# Patient Record
Sex: FEMALE | Race: OTHER | HISPANIC OR LATINO | Employment: UNEMPLOYED | URBAN - METROPOLITAN AREA
[De-identification: names, ages, dates, MRNs, and addresses within clinical notes are randomized per-mention and may not be internally consistent; named-entity substitution may affect disease eponyms.]

---

## 2018-01-15 NOTE — MISCELLANEOUS
Provider Comments  Provider Comments:   patient no showed their 9AM new patient appointment today 12/07/2016      Signatures   Electronically signed by : TONE Powers; Dec  7 2016  9:31AM EST                       (Author)

## 2018-06-07 ENCOUNTER — HOSPITAL ENCOUNTER (EMERGENCY)
Facility: HOSPITAL | Age: 28
Discharge: HOME | End: 2018-06-08
Attending: EMERGENCY MEDICINE

## 2018-06-07 VITALS
SYSTOLIC BLOOD PRESSURE: 110 MMHG | WEIGHT: 139 LBS | BODY MASS INDEX: 21.13 KG/M2 | RESPIRATION RATE: 16 BRPM | TEMPERATURE: 99.1 F | HEART RATE: 75 BPM | OXYGEN SATURATION: 98 % | DIASTOLIC BLOOD PRESSURE: 65 MMHG

## 2018-06-07 DIAGNOSIS — H10.9 CONJUNCTIVITIS: Primary | ICD-10-CM

## 2018-06-07 RX ORDER — PROPARACAINE HYDROCHLORIDE 5 MG/ML
2 SOLUTION/ DROPS OPHTHALMIC ONCE
Status: COMPLETED | OUTPATIENT
Start: 2018-06-07 | End: 2018-06-07

## 2018-06-07 RX ADMIN — PROPARACAINE HYDROCHLORIDE 2 DROP: 5 SOLUTION/ DROPS OPHTHALMIC at 23:48

## 2018-06-08 PROCEDURE — 99283 EMERGENCY DEPT VISIT LOW MDM: CPT

## 2018-06-08 RX ORDER — KETOTIFEN FUMARATE 0.35 MG/ML
1 SOLUTION/ DROPS OPHTHALMIC 2 TIMES DAILY PRN
Qty: 5 ML | Refills: 0 | Status: SHIPPED | OUTPATIENT
Start: 2018-06-08 | End: 2019-07-22

## 2018-06-08 RX ORDER — KETOROLAC TROMETHAMINE 5 MG/ML
1 SOLUTION OPHTHALMIC EVERY 6 HOURS
Qty: 0.4 ML | Refills: 0 | Status: SHIPPED | OUTPATIENT
Start: 2018-06-08 | End: 2019-07-22

## 2018-06-08 NOTE — DISCHARGE INSTRUCTIONS
Conjunctivitis   GENERAL INFORMATION:   What is conjunctivitis? Conjunctivitis, or pink eye, is inflammation of your conjunctiva  The conjunctiva is a thin tissue that covers the front of your eye and the back of your eyelids  The conjunctiva helps protect your eye and keep it moist         What causes conjunctivitis? Conjunctivitis is easily spread from person to person  The most common cause of conjunctivitis is infection with bacteria or a virus  This often happens when bacteria are introduced to your eye  For example, this can happen when you touch your eye or wear contact lenses  Allergies are also a common cause of conjunctivitis  The cells in your conjunctiva can react to an allergen  Some examples of allergens include grass, dust, animal fur, or mascara  What are the signs and symptoms of conjunctivitis? You will usually have symptoms in both eyes if your conjunctivitis is caused by allergies  You may also have other allergic symptoms, such as a rash or runny nose  Symptoms will usually start in 1 eye if your conjunctivitis is caused by a virus or bacteria  You may also have other symptoms of an infection, such as sore throat and fever  You may have any of the following:  · Redness in the whites of your eye    · Itching in your eye or around your eye    · Feeling like there is something in your eye    · Watery or thick, sticky discharge    · Crusty eyelids when you wake up in the morning    · Burning, stinging, or swelling in your eye    · Pain when you see bright light  How is conjunctivitis diagnosed? Your caregiver will ask about your symptoms and medical history  He will ask if you have been around anyone who is sick or has pink eye  He will ask if you have allergies  Tell him if you wear contact lenses  You may need any of the following:  · Eye exam:  Your caregiver will look at your eyes, eyelids, eyelashes, and the skin around your eyes  He will ask you to look in different directions   He may gently press on your eye or eyelid to see if there is discharge  He will also look for redness and swelling in your eyelids or conjunctiva  Your caregiver may gently swab your conjunctiva with a cotton swab and send it to the lab for tests  This will help your caregiver find out what is causing your conjunctivitis  · Slit-lamp microscope: Your caregiver will use a special microscope with a bright light to look into your eye  He will look for signs of infection or inflammation  This microscope also helps your caregiver see if the different parts of your eyes are healthy  How is conjunctivitis treated? Your conjunctivitis may go away on its own  Treatment depends on what is causing your conjunctivitis  You may need any of the following:  · Allergy medicine: This medicine helps decrease itchy, red, swollen eyes caused by allergies  It may be given as a pill, eye drops, or nasal spray  · Antibiotics:  You may need antibiotics if your conjunctivitis is caused by bacteria  This medicine may be given as a pill, eye drops, or eye ointment  · Steroid medicine: This medicine helps decrease inflammation  It may be given as a pill, eye drops, or nasal spray  How can I manage my symptoms? · Apply a cool compress:  Wet a washcloth with cold water and place it on your eye  This will help decrease swelling  · Use eye drops:  Eye drops, or artificial tears, can be bought without a doctor's order  They help keep your eye moist     · Do not wear contact lenses: They can irritate your eye  Throw away the pair you are using and ask when you can wear them again  Use a new pair of lenses when your caregiver says it is okay  · Flush your eye:  You may need to flush your eye with saline to help decrease your symptoms  Ask for more information on how to flush your eye  How do I prevent the spread of conjunctivitis? · Wash your hands often:  Wash your hands before you touch your eyes   Also wash your hands before you prepare or eat food and after you use the bathroom or change a diaper  · Avoid allergens:  Try to avoid the things that cause your allergies, such as pets, dust, or grass  · Avoid contact:  Do not share towels or washcloths  Try to stay away from others as much as possible  Ask when you can return to work or school  · Throw away eye makeup:  Throw away mascara and other eye makeup  What are the risks of conjunctivitis? You may have a burning, itching, or stinging feeling in your eye when you use eye drops or ointment  Your eye medicine may cause your symptoms, such as eye swelling, to get worse  Your eyes may become sensitive to light  Without treatment, you may get scars or sores in your eye  The swelling in your eye can cause your eyesight to get blurry  You may lose vision completely  The bacteria may spread to other parts of your eye, your sinuses, or the tissues in your brain  This can be life-threatening  Ask your caregiver if you have questions about the risks of conjunctivitis  When should I contact my caregiver? Contact your caregiver if:  · Your eyesight becomes blurry  · You have tiny bumps or spots of blood on your eye  · You have questions or concerns about your condition or care  When should I seek immediate care? Seek care immediately or call 911 if:  · The swelling in your eye gets worse, even after treatment  · Your vision suddenly becomes worse or you cannot see at all  · Your eye begins to bleed  CARE AGREEMENT:   You have the right to help plan your care  Learn about your health condition and how it may be treated  Discuss treatment options with your caregivers to decide what care you want to receive  You always have the right to refuse treatment  The above information is an  only  It is not intended as medical advice for individual conditions or treatments   Talk to your doctor, nurse or pharmacist before following any medical regimen to see if it is safe and effective for you  © 2014 [address] is for End User's use only and may not be sold, redistributed or otherwise used for commercial purposes  All illustrations and images included in CareNotes® are the copyrighted property of [employer]  or [employer]

## 2018-06-08 NOTE — ED NOTES
Patient states that she cannot see when RN is attempting to preform visual acuity  RN suggested that if patient opened her eyes, it would be more beneficial  Patient states that she still cannot see  RN then clarified if patient was having difficulty opening her eyes or if patient actually couldn't see visual acuity screen  Patient then opened her eyes and preformed visual acuity        Christiano Bean, HAYDEN  06/08/18 8058

## 2018-06-08 NOTE — ED PROVIDER NOTES
History  Chief Complaint   Patient presents with    Eye Pain     pt c/o bilateral eye pain with redness; pt states it started this morning at 04:00  pt denies any other symptoms  77-year-old female presents for evaluation of bilateral eye redness and pain that started this morning  Patient reports that this morning she was unable to open her eyes due to a burning sensation along with pain  States that she does not recall any inciting event that may have caused this  She reports that she has more pain in the right eye than the left  States that she has tried applying cool compresses minimal relief  Reports that she is photophobic along with having blurred vision  Denies increased lacrimation  She reports having nasal congestion  Does state that she has a history of seasonal allergies  She denies fever, chills, nausea, vomiting, facial pressure  None       Past Medical History:   Diagnosis Date    Patient denies medical problems     Patient is a currently breast-feeding mother        Past Surgical History:   Procedure Laterality Date    CHOLECYSTECTOMY      CHOLECYSTECTOMY LAPAROSCOPIC N/A 11/25/2016    Procedure: CHOLECYSTECTOMY LAPAROSCOPIC with intraoperative cholangiogram;  Surgeon: Blaze Rockwell MD;  Location: [facility];  Service:     ESOPHAGOGASTRODUODENOSCOPY N/A 11/23/2016    Procedure: ESOPHAGOGASTRODUODENOSCOPY (EGD); Surgeon: Bryanna Kitchen MD;  Location: [facility]; Service:        Family History   Problem Relation Age of Onset    Gallbladder disease Maternal Grandmother      I have reviewed and agree with the history as documented  Social History   Substance Use Topics    Smoking status: Never Smoker    Smokeless tobacco: Never Used    Alcohol use No        Review of Systems   Constitutional: Negative for chills and fever  HENT: Positive for congestion  Negative for rhinorrhea  Eyes: Positive for photophobia, pain, redness, itching and visual disturbance  Negative for discharge  Respiratory: Negative for cough  Cardiovascular: Negative for chest pain  Gastrointestinal: Negative for diarrhea, nausea and vomiting  Skin: Negative  Physical Exam  Physical Exam   Constitutional: She appears well-developed and well-nourished  No distress  In room with patient's eyes closed, hardly able to open them   HENT:   Head: Normocephalic  Eyes: EOM and lids are normal  Pupils are equal, round, and reactive to light  Lids are everted and swept, no foreign bodies found  Right eye exhibits no chemosis, no discharge, no exudate and no hordeolum  No foreign body present in the right eye  Left eye exhibits no chemosis, no discharge, no exudate and no hordeolum  No foreign body present in the left eye  Right conjunctiva is injected  Left conjunctiva is not injected  No scleral icterus  Slit lamp exam:       The right eye shows no corneal abrasion, no corneal flare, no corneal ulcer, no foreign body, no hyphema, no hypopyon and no fluorescein uptake  The left eye shows no corneal abrasion, no corneal flare, no corneal ulcer, no foreign body, no hyphema, no hypopyon and no fluorescein uptake  IOP:  Left eye av 75 Right eye avg: 15 40   Fluorescein exam: sparing of the limbus, no corneal abrasions or ulcerations noted  No FB noted  Skin: She is not diaphoretic  Vitals reviewed  Vital Signs  ED Triage Vitals [18]   Temperature Pulse Respirations Blood Pressure SpO2   99 1 °F (37 3 °C) 75 16 110/65 98 %      Temp Source Heart Rate Source Patient Position - Orthostatic VS BP Location FiO2 (%)   Oral Monitor Sitting Right arm --      Pain Score       9           Vitals:    188   BP: 110/65   Pulse: 75   Patient Position - Orthostatic VS: Sitting       Visual Acuity  Visual Acuity      Most Recent Value   Visual acuity R eye is  20/200   Visual acuity Left eye is  20/70   Wearing corrective eyewear/lenses?   No          ED Medications  Medications   proparacaine (ALCAINE) 0 5 % ophthalmic solution 2 drop (2 drops Both Eyes Given 6/7/18 5515)       Diagnostic Studies  Results Reviewed     None                 No orders to display              Procedures  Procedures       Phone Contacts  ED Phone Contact    ED Course                               MDM  CritCare Time    Disposition  Final diagnoses:   Conjunctivitis     Time reflects when diagnosis was documented in both MDM as applicable and the Disposition within this note     Time User Action Codes Description Comment    6/8/2018 12:29 AM Rick Grant Add [H10 9] Conjunctivitis       ED Disposition     ED Disposition Condition Comment    Discharge  Delia Naik discharge to home/self care      Condition at discharge: Good        Follow-up Information     Follow up With Specialties Details Why [facility] Schedule an appointment as soon as possible for a visit in 2 days [facility]            Discharge Medication List as of 6/8/2018 12:32 AM      START taking these medications    Details   ketorolac (ACULAR) 0 5 % ophthalmic solution Administer 1 drop to both eyes every 6 (six) hours for 2 days Space dosing 8 - 12 hours apart, Starting Fri 6/8/2018, Until Sun 6/10/2018, Print      ketotifen (ZADITOR) 0 025 % ophthalmic solution Administer 1 drop to both eyes 2 (two) times a day as needed (itchng and burning) for up to 5 days, Starting Fri 6/8/2018, Until Wed 6/13/2018, Print         CONTINUE these medications which have NOT CHANGED    Details   acetaminophen (TYLENOL) 325 mg tablet Take 2 tablets by mouth every 6 (six) hours as needed for mild pain or fever, Starting 11/26/2016, Until Discontinued, Print      docusate sodium (COLACE) 100 mg capsule Take 1 capsule by mouth 2 (two) times a day for 30 days, Starting 11/26/2016, Until Mon 12/26/16, Print      ondansetron (ZOFRAN-ODT) 4 mg disintegrating tablet Take 1 tablet by mouth every 8 (eight) hours as needed for nausea or vomiting for up to 10 days, Starting 11/26/2016, Until Tue 12/6/16, Print           No discharge procedures on file      ED Provider  Electronically Signed by           Jules Marquez PA-C  06/08/18 0145

## 2019-07-22 ENCOUNTER — HOSPITAL ENCOUNTER (EMERGENCY)
Facility: HOSPITAL | Age: 29
Discharge: HOME | End: 2019-07-22
Attending: EMERGENCY MEDICINE | Admitting: EMERGENCY MEDICINE

## 2019-07-22 VITALS
TEMPERATURE: 97.1 F | SYSTOLIC BLOOD PRESSURE: 114 MMHG | RESPIRATION RATE: 18 BRPM | DIASTOLIC BLOOD PRESSURE: 74 MMHG | WEIGHT: 157.63 LBS | HEART RATE: 56 BPM | OXYGEN SATURATION: 100 % | BODY MASS INDEX: 23.97 KG/M2

## 2019-07-22 DIAGNOSIS — N39.0 UTI (URINARY TRACT INFECTION): Primary | ICD-10-CM

## 2019-07-22 LAB
BACTERIA UR QL AUTO: ABNORMAL /HPF
BASOPHILS # BLD AUTO: 0.1 THOUSANDS/ΜL (ref 0–0.1)
BASOPHILS NFR BLD AUTO: 1 % (ref 0–1)
BILIRUB UR QL STRIP: NEGATIVE
CLARITY UR: ABNORMAL
COLOR UR: ABNORMAL
EOSINOPHIL # BLD AUTO: 0.1 THOUSAND/ΜL (ref 0–0.4)
EOSINOPHIL NFR BLD AUTO: 3 % (ref 0–6)
ERYTHROCYTE [DISTWIDTH] IN BLOOD BY AUTOMATED COUNT: 13.7 %
EXT PREG TEST URINE: NEGATIVE
EXT. CONTROL ED NAV: NORMAL
GLUCOSE UR STRIP-MCNC: NEGATIVE MG/DL
HCG SERPL QL: NEGATIVE
HCT VFR BLD AUTO: 34.7 % (ref 36–46)
HGB BLD-MCNC: 10.8 G/DL (ref 12–16)
HGB UR QL STRIP.AUTO: NEGATIVE
KETONES UR STRIP-MCNC: NEGATIVE MG/DL
LEUKOCYTE ESTERASE UR QL STRIP: 100
LYMPHOCYTES # BLD AUTO: 1.9 THOUSANDS/ΜL (ref 0.5–4)
LYMPHOCYTES NFR BLD AUTO: 39 % (ref 25–45)
MCH RBC QN AUTO: 24 PG (ref 26–34)
MCHC RBC AUTO-ENTMCNC: 31.3 G/DL (ref 31–36)
MCV RBC AUTO: 77 FL (ref 80–100)
MICROCYTES BLD QL AUTO: PRESENT
MONOCYTES # BLD AUTO: 0.4 THOUSAND/ΜL (ref 0.2–0.9)
MONOCYTES NFR BLD AUTO: 9 % (ref 1–10)
NEUTROPHILS # BLD AUTO: 2.4 THOUSANDS/ΜL (ref 1.8–7.8)
NEUTS SEG NFR BLD AUTO: 48 % (ref 45–65)
NITRITE UR QL STRIP: NEGATIVE
NON-SQ EPI CELLS URNS QL MICRO: ABNORMAL /HPF
PH UR STRIP.AUTO: 6 [PH]
PLATELET # BLD AUTO: 169 THOUSANDS/UL (ref 150–450)
PLATELET BLD QL SMEAR: ADEQUATE
PMV BLD AUTO: 10.7 FL (ref 8.9–12.7)
PROT UR STRIP-MCNC: NEGATIVE MG/DL
RBC # BLD AUTO: 4.53 MILLION/UL (ref 4–5.2)
RBC #/AREA URNS AUTO: ABNORMAL /HPF
RBC MORPH BLD: NORMAL
SP GR UR STRIP.AUTO: 1.01 (ref 1–1.04)
UROBILINOGEN UA: NEGATIVE MG/DL
WBC # BLD AUTO: 4.9 THOUSAND/UL (ref 4.5–11)
WBC #/AREA URNS AUTO: ABNORMAL /HPF

## 2019-07-22 PROCEDURE — 36415 COLL VENOUS BLD VENIPUNCTURE: CPT | Performed by: PHYSICIAN ASSISTANT

## 2019-07-22 PROCEDURE — 81001 URINALYSIS AUTO W/SCOPE: CPT | Performed by: PHYSICIAN ASSISTANT

## 2019-07-22 PROCEDURE — 81025 URINE PREGNANCY TEST: CPT | Performed by: PHYSICIAN ASSISTANT

## 2019-07-22 PROCEDURE — 96360 HYDRATION IV INFUSION INIT: CPT

## 2019-07-22 PROCEDURE — 85025 COMPLETE CBC W/AUTO DIFF WBC: CPT | Performed by: PHYSICIAN ASSISTANT

## 2019-07-22 PROCEDURE — 84703 CHORIONIC GONADOTROPIN ASSAY: CPT | Performed by: PHYSICIAN ASSISTANT

## 2019-07-22 PROCEDURE — 87086 URINE CULTURE/COLONY COUNT: CPT | Performed by: PHYSICIAN ASSISTANT

## 2019-07-22 PROCEDURE — 81003 URINALYSIS AUTO W/O SCOPE: CPT | Performed by: PHYSICIAN ASSISTANT

## 2019-07-22 PROCEDURE — 99284 EMERGENCY DEPT VISIT MOD MDM: CPT

## 2019-07-22 PROCEDURE — 99283 EMERGENCY DEPT VISIT LOW MDM: CPT | Performed by: PHYSICIAN ASSISTANT

## 2019-07-22 RX ORDER — SODIUM CHLORIDE 9 MG/ML
1000 INJECTION, SOLUTION INTRAVENOUS CONTINUOUS
Status: DISCONTINUED | OUTPATIENT
Start: 2019-07-22 | End: 2019-07-22 | Stop reason: HOSPADM

## 2019-07-22 RX ORDER — CEPHALEXIN 500 MG/1
500 CAPSULE ORAL EVERY 6 HOURS SCHEDULED
Qty: 14 CAPSULE | Refills: 0 | Status: SHIPPED | OUTPATIENT
Start: 2019-07-22 | End: 2019-07-29

## 2019-07-22 RX ADMIN — SODIUM CHLORIDE 1000 ML/HR: 0.9 INJECTION, SOLUTION INTRAVENOUS at 13:59

## 2019-07-23 LAB — BACTERIA UR CULT: NORMAL

## 2021-06-10 ENCOUNTER — HOSPITAL ENCOUNTER (EMERGENCY)
Facility: HOSPITAL | Age: 31
Discharge: HOME | End: 2021-06-10
Attending: EMERGENCY MEDICINE | Admitting: EMERGENCY MEDICINE
Payer: COMMERCIAL

## 2021-06-10 VITALS
OXYGEN SATURATION: 100 % | WEIGHT: 152.56 LBS | TEMPERATURE: 99.1 F | BODY MASS INDEX: 23.2 KG/M2 | SYSTOLIC BLOOD PRESSURE: 116 MMHG | DIASTOLIC BLOOD PRESSURE: 67 MMHG | RESPIRATION RATE: 16 BRPM | HEART RATE: 62 BPM

## 2021-06-10 DIAGNOSIS — Z20.822 CLOSE EXPOSURE TO COVID-19 VIRUS: Primary | ICD-10-CM

## 2021-06-10 LAB — SARS-COV-2 RNA RESP QL NAA+PROBE: NEGATIVE

## 2021-06-10 PROCEDURE — U0005 INFEC AGEN DETEC AMPLI PROBE: HCPCS | Performed by: STUDENT IN AN ORGANIZED HEALTH CARE EDUCATION/TRAINING PROGRAM

## 2021-06-10 PROCEDURE — 99283 EMERGENCY DEPT VISIT LOW MDM: CPT

## 2021-06-10 PROCEDURE — 99282 EMERGENCY DEPT VISIT SF MDM: CPT | Performed by: STUDENT IN AN ORGANIZED HEALTH CARE EDUCATION/TRAINING PROGRAM

## 2021-06-10 PROCEDURE — U0003 INFECTIOUS AGENT DETECTION BY NUCLEIC ACID (DNA OR RNA); SEVERE ACUTE RESPIRATORY SYNDROME CORONAVIRUS 2 (SARS-COV-2) (CORONAVIRUS DISEASE [COVID-19]), AMPLIFIED PROBE TECHNIQUE, MAKING USE OF HIGH THROUGHPUT TECHNOLOGIES AS DESCRIBED BY CMS-2020-01-R: HCPCS | Performed by: STUDENT IN AN ORGANIZED HEALTH CARE EDUCATION/TRAINING PROGRAM

## 2021-06-11 ENCOUNTER — TELEPHONE (OUTPATIENT)
Dept: EMERGENCY DEPT | Facility: HOSPITAL | Age: 31
End: 2021-06-11

## 2021-06-11 NOTE — DISCHARGE INSTRUCTIONS
Self quarantine for at least 10 days and until symptom free for 3 days  Continue to wear face mask when around other people to prevent further spread  May use OTC tylenol or ibuprofen every 6 hours as needed for fever and symptomatic relief  Follow up with primary care as needed for further evaluation  Return to ED if worsening of symptoms including fever greater that 104F, shortness of breath or difficulty breathing, chest pain, changes is mental status, blue discoloration of face or lips

## 2021-06-11 NOTE — ED PROVIDER NOTES
HPI: Patient is a 31 y o  female who presents with direct exposure to Covid days of no symptoms, requires screening which the patient describes at mild The patient has had contact with people with similar symptoms  The patient has not taken any medication  No Known Allergies    Past Medical History:   Diagnosis Date    Patient denies medical problems     Patient is a currently breast-feeding mother       Past Surgical History:   Procedure Laterality Date    CHOLECYSTECTOMY      CHOLECYSTECTOMY LAPAROSCOPIC N/A 11/25/2016    Procedure: CHOLECYSTECTOMY LAPAROSCOPIC with intraoperative cholangiogram;  Surgeon: Maciel Paulino MD;  Location: [facility];  Service:     ESOPHAGOGASTRODUODENOSCOPY N/A 11/23/2016    Procedure: ESOPHAGOGASTRODUODENOSCOPY (EGD); Surgeon: Laurita Garnica MD;  Location: [facility]; Service:      Social History     Tobacco Use    Smoking status: Never Smoker    Smokeless tobacco: Never Used   Substance Use Topics    Alcohol use: No     Comment: occ    Drug use: No       Nursing notes reviewed  Physical Exam:  ED Triage Vitals [06/10/21 2018]   Temperature Pulse Respirations Blood Pressure SpO2   99 1 °F (37 3 °C) 62 16 116/67 100 %      Temp Source Heart Rate Source Patient Position - Orthostatic VS BP Location FiO2 (%)   Oral Monitor Lying Right arm --      Pain Score       --           ROS: Positive for no complaints at this time, the remainder of a 10 organ system ROS was otherwise unremarkable    General: awake, alert, no acute distress    Head: normocephalic, atraumatic    Eyes: no scleral icterus  Ears: external ears normal, hearing grossly intact  Nose: external exam grossly normal, negative nasal discharge  Neck: symmetric, No JVD noted, trachea midline  Pulmonary: no respiratory distress, no tachypnea noted  Cardiovascular: appears well perfused  Abdomen: no distention noted  Musculoskeletal: no deformities noted, tone normal  Neuro: grossly non-focal  Psych: mood and affect appropriate    The patient is stable and has a history and physical exam consistent with a viral illness  COVID19 testing has been performed  I considered the patient's other medical conditions as applicable/noted above in my medical decision making  The patient is stable upon discharge  The plan is for supportive care at home  The patient (and any family present) verbalized understanding of the discharge instructions and warnings that would necessitate return to the Emergency Department  All questions were answered prior to discharge  Medications - No data to display  Final diagnoses:   Close exposure to COVID-19 virus     Time reflects when diagnosis was documented in both MDM as applicable and the Disposition within this note     Time User Action Codes Description Comment    6/10/2021  9:30 PM Alexander Wild Add [Z20 822] Close exposure to COVID-19 virus       ED Disposition     ED Disposition Condition Date/Time Comment    Discharge Stable Thu Antoine 10, 2021  9:29 PM Leo Carver discharge to home/self care  Follow-up Information     Follow up With Specialties Details Why Contact Info Additional [facility] Schedule an appointment as soon as possible for a visit  As needed [address] [facility] [address], [address] [facility] [id number]  [address], [address], [address] [place], [place], [address] [facility] Emergency Medicine Go to  As needed, If symptoms worsen [address] [address]  [address] [facility] Emergency Department, [address] [place] [address], [id number]        Patient's Medications    No medications on file     No discharge procedures on file      Electronically Signed by     Nadine Blanca PA-C  06/10/21 0517

## 2021-07-15 NOTE — ED PROVIDER NOTES
History  Chief Complaint   Patient presents with    Vaginal Bleeding - Pregnant     Tuesday took a home urine test-positive for pregnancy; states has been having nausea; states yesterday noted blood on tissue with wiping; no pain; again this morning some pinkness on tissue  States wants a blood test for pregnancy  This is a 35 y/o F  who presents today for "bleeding" when wiping herself this morning  The patient reports she took 5 home pregnancy tests that were positive  She reports some nausea  No vomiting  No gross abdominal pain  Reports some intermittent cramping, but currently has no pain/discomfort  She reports irregular menses  LMP was last month, but she is not sure when  She denies vaginal d/c  No dysuria or fevers  History provided by:  Patient      None       Past Medical History:   Diagnosis Date    Patient denies medical problems     Patient is a currently breast-feeding mother        Past Surgical History:   Procedure Laterality Date    CHOLECYSTECTOMY      CHOLECYSTECTOMY LAPAROSCOPIC N/A 2016    Procedure: CHOLECYSTECTOMY LAPAROSCOPIC with intraoperative cholangiogram;  Surgeon: London Torres MD;  Location: [facility];  Service:     ESOPHAGOGASTRODUODENOSCOPY N/A 2016    Procedure: ESOPHAGOGASTRODUODENOSCOPY (EGD); Surgeon: Erickson Lopez MD;  Location: [facility]; Service:        Family History   Problem Relation Age of Onset    Gallbladder disease Maternal Grandmother      I have reviewed and agree with the history as documented  Social History     Tobacco Use    Smoking status: Never Smoker    Smokeless tobacco: Never Used   Substance Use Topics    Alcohol use: No    Drug use: No        Review of Systems   Constitutional: Negative  HENT: Negative  Eyes: Negative  Respiratory: Negative  Cardiovascular: Negative  Gastrointestinal: Negative  Endocrine: Negative  Genitourinary: Positive for hematuria and vaginal bleeding   Negative for Natalie Johnson is a 17 month old male who was brought in for his Well Baby (17 months old) visit. Subjective   History was provided by mother  HPI:   Patient presents for:  Patient presents with:   Well Baby: 17 months old        Past Medical History  Hi appear patent bilaterally   Mouth/Throat: pediatric mouth/throat: oropharynx is normal, mucus membranes are moist  Neck/Thyroid: supple, no lymphadenopathy    Breast:normal on inspection     Respiratory: chest normal to inspection, normal respiratory rate decreased urine volume, difficulty urinating, dyspareunia, dysuria, flank pain, menstrual problem, pelvic pain and urgency  Musculoskeletal: Negative  Skin: Negative  Allergic/Immunologic: Negative  Neurological: Negative  Hematological: Negative  Psychiatric/Behavioral: Negative  Physical Exam  Physical Exam   Constitutional: She is oriented to person, place, and time  She appears well-developed and well-nourished  Cardiovascular: Normal rate, regular rhythm and normal heart sounds  Pulmonary/Chest: Effort normal and breath sounds normal    Abdominal: Soft  Bowel sounds are normal  She exhibits no distension and no mass  There is no tenderness  There is no guarding  Neurological: She is alert and oriented to person, place, and time  Skin: Capillary refill takes less than 2 seconds  Psychiatric: She has a normal mood and affect  Her behavior is normal  Judgment and thought content normal    Vitals reviewed        Vital Signs  ED Triage Vitals [07/22/19 1308]   Temperature Pulse Respirations Blood Pressure SpO2   (!) 97 1 °F (36 2 °C) 74 18 121/82 100 %      Temp Source Heart Rate Source Patient Position - Orthostatic VS BP Location FiO2 (%)   Tympanic Monitor Sitting Left arm --      Pain Score       No Pain           Vitals:    07/22/19 1308 07/22/19 1514   BP: 121/82 114/74   Pulse: 74 56   Patient Position - Orthostatic VS: Sitting Sitting         Visual Acuity      ED Medications  Medications - No data to display    Diagnostic Studies  Results Reviewed     Procedure Component Value Units Date/Time    hCG, qualitative pregnancy [[id number]]  (Normal) Collected:  07/22/19 1356    Lab Status:  Final result Specimen:  Blood from Arm, Right Updated:  07/22/19 1433     Preg, Serum Negative    CBC and differential [[id number]]  (Abnormal) Collected:  07/22/19 1356    Lab Status:  Final result Specimen:  Blood from Arm, Right Updated:  07/22/19 1415     WBC 4 90 Thousand/uL      RBC 4 53 Million/uL      Hemoglobin 10 8 g/dL      Hematocrit 34 7 %      MCV 77 fL      MCH 24 0 pg      MCHC 31 3 g/dL      RDW 13 7 %      MPV 10 7 fL      Platelets 114 Thousands/uL      Neutrophils Relative 48 %      Lymphocytes Relative 39 %      Monocytes Relative 9 %      Eosinophils Relative 3 %      Basophils Relative 1 %      Neutrophils Absolute 2 40 Thousands/µL      Lymphocytes Absolute 1 90 Thousands/µL      Monocytes Absolute 0 40 Thousand/µL      Eosinophils Absolute 0 10 Thousand/µL      Basophils Absolute 0 10 Thousands/µL     Urine Microscopic [[id number]]  (Abnormal) Collected:  07/22/19 1344    Lab Status:  Final result Specimen:  Urine, Clean Catch Updated:  07/22/19 1407     RBC, UA 0-1 /hpf      WBC, UA 4-10 /hpf      Epithelial Cells Moderate /hpf      Bacteria, UA Occasional /hpf      URINE COMMENT --    UA w Reflex to Microscopic w Reflex to Culture [[id number]]  (Abnormal) Collected:  07/22/19 1344    Lab Status:  Final result Specimen:  Urine, Clean Catch Updated:  07/22/19 1358     Color, UA Straw     Clarity, UA Slightly Cloudy     Specific Gravity, UA 1 015     pH, UA 6 0     Leukocytes,  0     Nitrite, UA Negative     Protein, UA Negative mg/dl      Glucose, UA Negative mg/dl      Ketones, UA Negative mg/dl      Bilirubin, UA Negative     Blood, UA Negative     URINE COMMENT --     UROBILINOGEN UA Negative mg/dL     Urine culture [[id number]] Collected:  07/22/19 1344    Lab Status: In process Specimen:  Urine, Clean Catch Updated:  07/22/19 1358    POCT pregnancy, urine [[id number]]  (Normal) Resulted:  07/22/19 1345    Lab Status:  Final result Updated:  07/22/19 1346     EXT PREG TEST UR (Ref: Negative) Negative     Control Valid                 No orders to display              Procedures  Procedures       ED Course                               MDM  Number of Diagnoses or Management Options  UTI (urinary tract infection):   Diagnosis management comments:  This is a 35 y/o F who presents immunization (PEDVAX) 3 dose (prefilled syringe) [45145]      Varicella (Chicken Pox) Vaccine      07/15/21  Frank Handsome, DO today for hematuria/bleeding noted when she wiped this AM  She believed she was pregnant, but pregnancy test here and beta HcG are negative  The patient was noted to have some leukocytes present in her urine  Her labs are unremarkable  She was treated for a UTI here and told to follow up with OBGYN - provided her with multiple numbers  D/c home stable  Disposition  Final diagnoses:   UTI (urinary tract infection)     Time reflects when diagnosis was documented in both MDM as applicable and the Disposition within this note     Time User Action Codes Description Comment    7/22/2019  2:59 PM Bibiana Ash [N39 0] UTI (urinary tract infection)       ED Disposition     ED Disposition Condition Date/Time Comment    Discharge Stable Mon Jul 22, 2019  2:59 PM [id number] [facility]  Follow-up Information     Follow up With Specialties Details Why Contact Info [facility] Schedule an appointment as soon as possible for a visit   [place]  [phone]  [address], [phone]          Discharge Medication List as of 7/22/2019  3:00 PM      START taking these medications    Details   cephalexin (KEFLEX) 500 mg capsule Take 1 capsule (500 mg total) by mouth every 6 (six) hours for 7 days, Starting Mon 7/22/2019, Until Mon 7/29/2019, Print           No discharge procedures on file      ED Provider  Electronically Signed by           Ledora Peabody, PA-C  07/22/19 2003

## 2024-09-17 ENCOUNTER — HOSPITAL ENCOUNTER (EMERGENCY)
Facility: HOSPITAL | Age: 34
Discharge: HOME | End: 2024-09-17
Attending: EMERGENCY MEDICINE

## 2024-09-17 VITALS
OXYGEN SATURATION: 100 % | WEIGHT: 168.65 LBS | RESPIRATION RATE: 17 BRPM | TEMPERATURE: 98.2 F | BODY MASS INDEX: 25.64 KG/M2 | SYSTOLIC BLOOD PRESSURE: 132 MMHG | DIASTOLIC BLOOD PRESSURE: 81 MMHG | HEART RATE: 60 BPM

## 2024-09-17 DIAGNOSIS — Z32.02 ENCOUNTER FOR PREGNANCY TEST WITH RESULT NEGATIVE: ICD-10-CM

## 2024-09-17 DIAGNOSIS — N93.9 VAGINAL BLEEDING: Primary | ICD-10-CM

## 2024-09-17 LAB
B-HCG SERPL-ACNC: 0.6 MIU/ML (ref 0–5)
BACTERIA UR QL AUTO: ABNORMAL /HPF
BILIRUB UR QL STRIP: NEGATIVE
CLARITY UR: CLEAR
COLOR UR: YELLOW
EXT PREGNANCY TEST URINE: NEGATIVE
EXT. CONTROL: NORMAL
GLUCOSE UR STRIP-MCNC: NEGATIVE MG/DL
HGB UR QL STRIP.AUTO: ABNORMAL
KETONES UR STRIP-MCNC: NEGATIVE MG/DL
LEUKOCYTE ESTERASE UR QL STRIP: NEGATIVE
MUCOUS THREADS UR QL AUTO: ABNORMAL
NITRITE UR QL STRIP: NEGATIVE
NON-SQ EPI CELLS URNS QL MICRO: ABNORMAL /HPF
PH UR STRIP.AUTO: 6 [PH] (ref 4.5–8)
PROT UR STRIP-MCNC: NEGATIVE MG/DL
RBC #/AREA URNS AUTO: ABNORMAL /HPF
SP GR UR STRIP.AUTO: 1.02 (ref 1–1.03)
UROBILINOGEN UR QL STRIP.AUTO: 1 E.U./DL
WBC #/AREA URNS AUTO: ABNORMAL /HPF

## 2024-09-17 PROCEDURE — 99283 EMERGENCY DEPT VISIT LOW MDM: CPT

## 2024-09-17 PROCEDURE — 99284 EMERGENCY DEPT VISIT MOD MDM: CPT

## 2024-09-17 PROCEDURE — 84702 CHORIONIC GONADOTROPIN TEST: CPT

## 2024-09-17 PROCEDURE — 81025 URINE PREGNANCY TEST: CPT

## 2024-09-17 PROCEDURE — 81001 URINALYSIS AUTO W/SCOPE: CPT

## 2024-09-17 PROCEDURE — 36415 COLL VENOUS BLD VENIPUNCTURE: CPT

## 2024-09-17 NOTE — ED PROVIDER NOTES
1. Vaginal bleeding    2. Encounter for pregnancy test with result negative      ED Disposition       ED Disposition   Discharge    Condition   Stable    Date/Time   Tue Sep 17, 2024 11:54 AM    Comment   Mariposa Flood discharge to home/self care.                   Assessment & Plan       Medical Decision Making  Patient is a 33 year old female presenting with vaginal bleeding after positive home pregnancy tests. Vitals within normal limits and she is in no acute distress. Ddx: threatened miscarriage, ectopic pregnancy, menses. Urine pregnancy test was negative here so performed serum quantitative hcg which was also negative. No further labs or ultrasound indicated with negative pregnancy tests. Discussed with patient that she may have had false positives given that tests were sitting out for a while. Advised to follow up with OB/GYN.     I have discussed findings and plan for discharge with the patient/caregiver. Follow up with the appropriate providers including primary care physician was discussed. Return precautions discussed with patient/caregiver as outlined in AVS. Patient/caregiver verbally expressed understanding. Patient stable at time of discharge and ambulated out of the emergency department.       Amount and/or Complexity of Data Reviewed  Labs: ordered.                       Medications - No data to display    History of Present Illness       Patient is a  female presenting with vaginal bleeding. Her periods are irregular, LMP 8/15/24. She had 5 positive home pregnancy tests over the past week however notes that they were initially negative but turned positive after sitting out for a while. She developed light vaginal bleeding this morning with passage of one large blood clot. She has mild lower abdominal cramping. Otherwise no fevers, chills, nausea, vomiting, diarrhea, urinary symptoms.         Vaginal Bleeding  Associated symptoms: no abdominal pain, no back pain, no dysuria and no fever             Review of Systems   Constitutional:  Negative for chills and fever.   HENT:  Negative for ear pain and sore throat.    Eyes:  Negative for pain and visual disturbance.   Respiratory:  Negative for cough and shortness of breath.    Cardiovascular:  Negative for chest pain and palpitations.   Gastrointestinal:  Negative for abdominal pain and vomiting.   Genitourinary:  Positive for pelvic pain and vaginal bleeding. Negative for dysuria and hematuria.   Musculoskeletal:  Negative for arthralgias and back pain.   Skin:  Negative for color change and rash.   Neurological:  Negative for seizures and syncope.   All other systems reviewed and are negative.          Objective     ED Triage Vitals [09/17/24 1003]   Temperature Pulse Blood Pressure Respirations SpO2 Patient Position - Orthostatic VS   98.2 °F (36.8 °C) 60 132/81 17 100 % Sitting      Temp Source Heart Rate Source BP Location FiO2 (%) Pain Score    Oral Monitor Right arm -- --        Physical Exam  Vitals and nursing note reviewed.   Constitutional:       General: She is not in acute distress.     Appearance: Normal appearance.   HENT:      Head: Normocephalic and atraumatic.      Right Ear: External ear normal.      Left Ear: External ear normal.      Nose: Nose normal.      Mouth/Throat:      Mouth: Mucous membranes are moist.   Eyes:      General: No scleral icterus.        Right eye: No discharge.         Left eye: No discharge.      Extraocular Movements: Extraocular movements intact.      Conjunctiva/sclera: Conjunctivae normal.   Cardiovascular:      Rate and Rhythm: Normal rate and regular rhythm.      Pulses: Normal pulses.      Heart sounds: Normal heart sounds.   Pulmonary:      Effort: Pulmonary effort is normal. No respiratory distress.      Breath sounds: Normal breath sounds.   Abdominal:      Palpations: Abdomen is soft.      Tenderness: There is no abdominal tenderness.   Musculoskeletal:         General: No tenderness, deformity or  signs of injury.      Cervical back: Normal range of motion and neck supple.   Skin:     General: Skin is dry.      Coloration: Skin is not jaundiced.      Findings: No erythema or rash.   Neurological:      General: No focal deficit present.      Mental Status: She is alert and oriented to person, place, and time. Mental status is at baseline.      Motor: No weakness.      Gait: Gait normal.   Psychiatric:         Mood and Affect: Mood normal.         Behavior: Behavior normal.         Thought Content: Thought content normal.         Labs Reviewed   URINE MICROSCOPIC - Abnormal       Result Value    RBC, UA 1-2      WBC, UA None Seen      Epithelial Cells Occasional      Bacteria, UA None Seen      MUCUS THREADS Occasional (*)    URINE MACROSCOPIC, POC - Abnormal    Color, UA Yellow      Clarity, UA Clear      pH, UA 6.0      Leukocytes, UA Negative      Nitrite, UA Negative      Protein, UA Negative      Glucose, UA Negative      Ketones, UA Negative      Urobilinogen, UA 1.0      Bilirubin, UA Negative      Occult Blood, UA Small (*)     Specific Gravity, UA 1.025      Narrative:     CLINITEK RESULT   HCG, QUANTITATIVE - Normal    HCG, Quant 0.6      Narrative:      Expected Ranges:    HCG results between 5.0 and 25.0 mIU/mL may be indicative of early pregnancy but should be interpreted in light of the total clinical presentation.    HCG can rise to detectable levels in tatyana and post menopausal women (0-11.6 mIU/mL).     Approximate               Approximate HCG  Gestation age          Concentration ( mIU/mL)  _____________          ______________________   Weeks                      HCG values  0.2-1                       5-50  1-2                           2-3                         100-5000  3-4                         [id number]  4-5                         [id number]  5-6                         [id number]  6-8                         [id number]  8-12                        [id number]     POCT  PREGNANCY, URINE - Normal    EXT Preg Test, Ur Negative      Control Valid       No orders to display       Procedures       Sade Lane PA-C  09/17/24 6629

## 2024-09-17 NOTE — DISCHARGE INSTRUCTIONS
Infectious Disease Progress Note    Requested by: Dr. Tricia Lopez, dr. Del Diaz    Reason: sepsis, acute paraplegia    Current abx Prior abx   Ceftriaxone since 4/24 Cefepime 4/20-4/21  vancomycin  4/20-4/24  Gentamicin 4/21-4/24  Metronidazole  4/20-4/24     Lines:   PICC RUE 4/26    Assessment :    79 y.o., right handed female, with an established history of hypertension, complete heart block with permanent pacemaker placed, diabetes mellitus, diabetic peripheral neuropathy, obesity, admitted to [facility] on 4/20/17. Clinical presentation consistent with  Sepsis (POA)  secondary to group B streptococcus bloodstream infection (positive blood cultures 4/20, negative blood cultures 4/21). Most likely source of bloodstream infection is infected right psoas hematoma/abscess. 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 No signs of neurological recovery on today's exam     2 week h/o pain at the site of abdominal pacemaker, tenderness at the site - however, patient doesn't have erythema at the site of pacemaker. No fluid collection noted at pacemaker pocket site per usg 4/24. Quick clearance of bacteremia would argue against endocarditis/endovascular infection. At this time risk of removal of pacemaker/general pocket change exceed the benefit. Discussed with cardiology. Would recommend close monitoring. Abdominal usg 4/24 doesn't reveal evidence of abscess/fluid collection at the site of pacemaker pocket. Enterococcus in urine cultures 4/20 likely colonizer    Acute renal failure: nephrology consult appreciated. Difficult to determine exact etiology of ARF at this time. Slight improvement in creatinine today. Low grade fever overnight - Ct scan 5/4 reveals increasing focal area within the right psoas muscle inferior to the previously drained area.  could represent either Your urine and blood pregnancy tests were negative. Please follow up with OB/GYN as needed.   spread of infection inferiorly or intramuscular hematoma. Decreased drainage from the right psoas abscess. Monitor for partially drained abscess    No evidence of pneumonia    Persistent low grade fevers: d/d-  due to unidentified bleeding/undrained pockets of infection right psoas/colonized abdominal pacemaker. No worsening abdominal pain. Recommendations:    1. cont ceftriaxone tentatively till 6/4/17  2. Drain check by IR to determine need for repositioning/removal. If worsening fevers after drain removed, will need surgical evacuation of the psoas hematoma  3. F/u cbc, clinically    Above plan was discussed in details with patient, dr. Rusty Sommers. Please call me if any further questions or concerns. Will continue to participate in the care of this patient. subjective:    No increased right flank pain. Denies chills. Denies cp, sob. Unable to move legs. decreased pain at the site of pacemaker left abdomen. No nausea, vomiting. No new rash/itching/joint pain/back pain. Home Medication List    Details   gabapentin (NEURONTIN) 400 mg capsule Take 1 Cap by mouth two (2) times a day. Indications: NEUROPATHIC PAIN  Qty: 30 Cap, Refills: 0    Associated Diagnoses: Iliopsoas muscle hematoma, right, subsequent encounter      cholecalciferol (VITAMIN D3) 1,000 unit tablet Take 2 Tabs by mouth daily. Indications: PREVENTION OF VITAMIN D DEFICIENCY  Qty: 30 Tab, Refills: 0      SITagliptin (JANUVIA) 50 mg tablet Take 50 mg by mouth daily. Indications: type 2 diabetes mellitus      potassium chloride (KLOR-CON M20) 20 mEq tablet Take 20 mEq by mouth two (2) times a day. Indications: HYPOKALEMIA PREVENTION      ondansetron (ZOFRAN ODT) 4 mg disintegrating tablet Take 4 mg by mouth every eight (8) hours as needed for Nausea. cyclobenzaprine (FLEXERIL) 10 mg tablet Take 10 mg by mouth as needed for Muscle Spasm(s). Indications:  MUSCLE SPASM      carvedilol (COREG) 6.25 mg tablet Take 1 Tab by mouth two (2) times daily (with meals). Indications: hypertension  Qty: 30 Tab, Refills: 0    Associated Diagnoses: Benign hypertensive heart disease with systolic CHF, NYHA class 2 (HCC)      acetaminophen (TYLENOL) 325 mg tablet Take 2 Tabs by mouth every four (4) hours as needed (for fever or pain level less than 5/10). Indications: Fever, Pain  Qty: 30 Tab, Refills: 0    Associated Diagnoses: Iliopsoas muscle hematoma, right, subsequent encounter      allopurinol (ZYLOPRIM) 100 mg tablet Take 1 Tab by mouth daily. Indications: GOUT  Qty: 15 Tab, Refills: 0    Associated Diagnoses: Chronic gout, unspecified cause, unspecified site      insulin glargine (LANTUS) 100 unit/mL injection 15 Units by SubCUTAneous route nightly. Indications: type 2 diabetes mellitus  Qty: 1 Vial, Refills: 0    Associated Diagnoses: Type 2 diabetes mellitus with diabetic neuropathy, with long-term current use of insulin ([facility])      docusate sodium (COLACE) 100 mg capsule Take 1 Cap by mouth daily (after breakfast). Indications: Constipation  Qty: 15 Cap, Refills: 0      senna-docusate (PERICOLACE) 8.6-50 mg per tablet Take 2 Tabs by mouth daily (after dinner). Indications: Constipation  Qty: 30 Tab, Refills: 0      oxyCODONE-acetaminophen (PERCOCET 7.5) 7.5-325 mg per tablet Take 1 Tab by mouth every four (4) hours as needed (for pain level greater than 4/10). Max Daily Amount: 6 Tabs. Indications: Pain  Qty: 50 Tab, Refills: 0    Associated Diagnoses: Iliopsoas muscle hematoma, right, subsequent encounter      bumetanide (BUMEX) 1 mg tablet TAKE 1 TABLET TWICE A DAY  Qty: 180 Tab, Refills: 1      pravastatin (PRAVACHOL) 40 mg tablet Take 40 mg by mouth nightly. Indications: DYSLIPIDEMIA      ferrous sulfate 325 mg (65 mg iron) tablet Take 1 Tab by mouth two (2) times daily (with meals).   Qty: 30 Tab, Refills: 0      insulin aspart (NOVOLOG) 100 unit/mL injection INITIATE INSULIN CORRECTIVE PROTOCOL (HR): Normal Insulin Sensitivity  For Blood Sugar (mg/dL) of:    Less than 150 =   0 units          150 -199 =   2 units 200 -249 =   4 units 250 -299 =   6 units 300 -349 =   8 units 350 and above =   10 units If 2 glucose readings are above 200 mg/dL  Qty: 10 mL, Refills: 6             Current Facility-Administered Medications   Medication Dose Route Frequency    0.9% sodium chloride infusion 250 mL  250 mL IntraVENous PRN    0.9% sodium chloride infusion 250 mL  250 mL IntraVENous PRN    famotidine (PEPCID) tablet 20 mg  20 mg Oral DAILY    insulin lispro (HUMALOG) injection   SubCUTAneous AC&HS    0.9% sodium chloride infusion 250 mL  250 mL IntraVENous PRN    cefTRIAXone (ROCEPHIN) 2 g in 0.9% sodium chloride (MBP/ADV) 50 mL MBP  2 g IntraVENous Q24H    insulin glargine (LANTUS) injection 20 Units  20 Units SubCUTAneous DAILY    diphenhydrAMINE (BENADRYL) capsule 50 mg  50 mg Oral Q4H PRN    polyethylene glycol (MIRALAX) packet 17 g  17 g Oral TID    cholecalciferol (VITAMIN D3) tablet 2,000 Units  2,000 Units Oral DAILY    docusate sodium (COLACE) capsule 100 mg  100 mg Oral DAILY AFTER BREAKFAST    gabapentin (NEURONTIN) capsule 400 mg  400 mg Oral BID    oxyCODONE-acetaminophen (PERCOCET 7.5) 7.5-325 mg per tablet 1 Tab  1 Tab Oral Q4H PRN    pravastatin (PRAVACHOL) tablet 40 mg  40 mg Oral QHS    senna-docusate (PERICOLACE) 8.6-50 mg per tablet 2 Tab  2 Tab Oral PCD    acetaminophen (TYLENOL) tablet 500 mg  500 mg Oral Q6H PRN    allopurinol (ZYLOPRIM) tablet 100 mg  100 mg Oral DAILY    clotrimazole (MYCELEX) frances 10 mg  10 mg Oral 5XD    folic acid (FOLVITE) tablet 1 mg  1 mg Oral DAILY    ondansetron (ZOFRAN) injection 4 mg  4 mg IntraVENous Q8H PRN    glucose chewable tablet 16 g  16 g Oral PRN    glucagon (GLUCAGEN) injection 1 mg  1 mg IntraMUSCular PRN    dextrose (D50W) injection syrg 12.5-25 g  25-50 mL IntraVENous PRN       Allergies: Vancomycin; Ampicillin; Bactrim [sulfamethoxazole-trimethoprim];  Blueberry; Ciprofloxacin; Codeine; Crestor [rosuvastatin]; Darvocet a500 [propoxyphene n-acetaminophen]; Demerol [meperidine]; Levaquin [levofloxacin]; Lipitor [atorvastatin]; Magnesium oxide; Minocin [minocycline]; Pcn [penicillins]; Pravachol [pravastatin]; Sulfa (sulfonamide antibiotics); Ultracet [tramadol-acetaminophen]; Vicodin [hydrocodone-acetaminophen]; Vytorin 10-10 [ezetimibe-simvastatin]; and Percodan [oxycodone hcl-oxycodone-asa]    Temp (24hrs), Av.2 °F (37.3 °C), Min:98.7 °F (37.1 °C), Max:100.1 °F (37.8 °C)    Visit Vitals    /57 (BP 1 Location: Left arm, BP Patient Position: At rest)    Pulse 87    Temp 98.8 °F (37.1 °C)    Resp 18    Ht 5' 7\" (1.702 m)    Wt 116.9 kg (257 lb 12.8 oz)    SpO2 93%    Breastfeeding No    BMI 40.38 kg/m2       ROS: patient unable to communicate fluently. Detailed ros not feasible. Physical Exam:    General: Well developed, well nourished female laying on the bed AAOx3 NAD    General:   awake alert and oriented   HEENT:  Normocephalic, atraumatic, PERRL, EOMI, no scleral icterus or pallor; no conjunctival hemmohage;  nasal and oral mucous are moist and without evidence of lesions. Neck supple, no bruits. Lymph Nodes:   no cervical, axillary or inguinal adenopathy   Lungs:   non-labored, bilaterally clear to auscultation- no crackles wheezes rales or rhonchi   Heart:   s1 and s2 irregular; no rubs or gallops, no edema, + pedal pulses   Abdomen:  soft, non-distended, active bowel sounds, no hepatomegaly, no splenomegaly. no tenderness over the left abdominal pacemaker, no overlying erythema or fluctuance   Genitourinary:  deferred   Extremities:   no clubbing, cyanosis; no joint effusions or swelling; muscle mass appropriate for age   Neurologic:  No gross focal sensory abnormalities; 5/5 muscle strength to upper extremities. 0/5 strength in lower extremities.   Cranial nerves intact                        Skin:  Surgical scars abdomen, left neck well healed Back:  right flank drain with purulent drainage,  no paraspinal muscle guarding or rigidity, right CVA tenderness     Psychiatric:  No suicidal or homicidal ideations, appropriate mood and affect         Labs: Results:   Chemistry Recent Labs      05/09/17   0404  05/07/17   0231   GLU  78  91   NA  138  138   K  4.2  3.6   CL  103  104   CO2  30  29   BUN  22*  26*   CREA  2.07*  2.38*   CA  8.1*  8.2*   AGAP  5  5   BUCR  11*  11*   ALB  1.5*   --       CBC w/Diff Recent Labs      05/09/17   0404  05/08/17   1342   WBC  6.9  7.4   RBC  3.34*  3.50*   HGB  8.9*  9.2*   HCT  27.1*  28.8*   PLT  164  151   GRANS  51  56   LYMPH  35  31   EOS  3  3      Microbiology No results for input(s): CULT in the last 72 hours.        RADIOLOGY:    All available imaging studies/reports in [facility] for this admission were reviewed    Dr. Kenrick Maldonado, Infectious Disease Specialist  [phone]  May 9, 2017  3:49 PM

## 2024-09-17 NOTE — Clinical Note
Mariposa Flood was seen and treated in our emergency department on 9/17/2024.                Diagnosis:     Mariposa  .    She may return on this date: 09/18/2024         If you have any questions or concerns, please don't hesitate to call.      Sade Lane PA-C    ______________________________           _______________          _______________  Hospital Representative                              Date                                Time

## 2024-11-20 ENCOUNTER — HOSPITAL ENCOUNTER (EMERGENCY)
Facility: HOSPITAL | Age: 34
Discharge: HOME | End: 2024-11-20
Attending: EMERGENCY MEDICINE

## 2024-11-20 ENCOUNTER — APPOINTMENT (EMERGENCY)
Dept: RADIOLOGY | Facility: HOSPITAL | Age: 34
End: 2024-11-20

## 2024-11-20 VITALS
OXYGEN SATURATION: 100 % | BODY MASS INDEX: 25.83 KG/M2 | DIASTOLIC BLOOD PRESSURE: 80 MMHG | RESPIRATION RATE: 18 BRPM | HEIGHT: 68 IN | HEART RATE: 62 BPM | SYSTOLIC BLOOD PRESSURE: 114 MMHG | WEIGHT: 170.42 LBS | TEMPERATURE: 98.8 F

## 2024-11-20 DIAGNOSIS — N39.0 UTI (URINARY TRACT INFECTION): ICD-10-CM

## 2024-11-20 DIAGNOSIS — R68.89 FLU-LIKE SYMPTOMS: Primary | ICD-10-CM

## 2024-11-20 LAB
ALBUMIN SERPL BCG-MCNC: 4.3 G/DL (ref 3.5–5)
ALP SERPL-CCNC: 79 U/L (ref 34–104)
ALT SERPL W P-5'-P-CCNC: 11 U/L (ref 7–52)
ANION GAP SERPL CALCULATED.3IONS-SCNC: 5 MMOL/L (ref 4–13)
AST SERPL W P-5'-P-CCNC: 17 U/L (ref 13–39)
ATRIAL RATE: 65 BPM
BACTERIA UR QL AUTO: ABNORMAL /HPF
BASOPHILS # BLD AUTO: 0.02 THOUSANDS/ÂΜL (ref 0–0.1)
BASOPHILS NFR BLD AUTO: 0 % (ref 0–1)
BILIRUB SERPL-MCNC: 0.34 MG/DL (ref 0.2–1)
BILIRUB UR QL STRIP: NEGATIVE
BUN SERPL-MCNC: 8 MG/DL (ref 5–25)
CALCIUM SERPL-MCNC: 9.1 MG/DL (ref 8.4–10.2)
CHLORIDE SERPL-SCNC: 106 MMOL/L (ref 96–108)
CLARITY UR: CLEAR
CO2 SERPL-SCNC: 27 MMOL/L (ref 21–32)
COLOR UR: YELLOW
CREAT SERPL-MCNC: 0.73 MG/DL (ref 0.6–1.3)
EOSINOPHIL # BLD AUTO: 0.17 THOUSAND/ÂΜL (ref 0–0.61)
EOSINOPHIL NFR BLD AUTO: 3 % (ref 0–6)
ERYTHROCYTE [DISTWIDTH] IN BLOOD BY AUTOMATED COUNT: 20.3 % (ref 11.6–15.1)
EXT PREGNANCY TEST URINE: NEGATIVE
EXT. CONTROL: NORMAL
FLUAV AG UPPER RESP QL IA.RAPID: NEGATIVE
FLUBV AG UPPER RESP QL IA.RAPID: NEGATIVE
GFR SERPL CREATININE-BSD FRML MDRD: 110 ML/MIN/1.73SQ M
GLUCOSE SERPL-MCNC: 89 MG/DL (ref 65–140)
GLUCOSE UR STRIP-MCNC: NEGATIVE MG/DL
HCT VFR BLD AUTO: 32.7 % (ref 34.8–46.1)
HGB BLD-MCNC: 9.4 G/DL (ref 11.5–15.4)
HGB UR QL STRIP.AUTO: ABNORMAL
IMM GRANULOCYTES # BLD AUTO: 0.01 THOUSAND/UL (ref 0–0.2)
IMM GRANULOCYTES NFR BLD AUTO: 0 % (ref 0–2)
KETONES UR STRIP-MCNC: NEGATIVE MG/DL
LEUKOCYTE ESTERASE UR QL STRIP: ABNORMAL
LIPASE SERPL-CCNC: 35 U/L (ref 11–82)
LYMPHOCYTES # BLD AUTO: 2.07 THOUSANDS/ÂΜL (ref 0.6–4.47)
LYMPHOCYTES NFR BLD AUTO: 41 % (ref 14–44)
MCH RBC QN AUTO: 20.9 PG (ref 26.8–34.3)
MCHC RBC AUTO-ENTMCNC: 28.7 G/DL (ref 31.4–37.4)
MCV RBC AUTO: 73 FL (ref 82–98)
MONOCYTES # BLD AUTO: 0.39 THOUSAND/ÂΜL (ref 0.17–1.22)
MONOCYTES NFR BLD AUTO: 8 % (ref 4–12)
MUCOUS THREADS UR QL AUTO: ABNORMAL
NEUTROPHILS # BLD AUTO: 2.41 THOUSANDS/ÂΜL (ref 1.85–7.62)
NEUTS SEG NFR BLD AUTO: 48 % (ref 43–75)
NITRITE UR QL STRIP: NEGATIVE
NON-SQ EPI CELLS URNS QL MICRO: ABNORMAL /HPF
NRBC BLD AUTO-RTO: 0 /100 WBCS
P AXIS: 74 DEGREES
PH UR STRIP.AUTO: 6.5 [PH] (ref 4.5–8)
PLATELET # BLD AUTO: 240 THOUSANDS/UL (ref 149–390)
POTASSIUM SERPL-SCNC: 4.1 MMOL/L (ref 3.5–5.3)
PR INTERVAL: 142 MS
PROT SERPL-MCNC: 7.9 G/DL (ref 6.4–8.4)
PROT UR STRIP-MCNC: NEGATIVE MG/DL
QRS AXIS: 67 DEGREES
QRSD INTERVAL: 80 MS
QT INTERVAL: 384 MS
QTC INTERVAL: 399 MS
RBC # BLD AUTO: 4.49 MILLION/UL (ref 3.81–5.12)
RBC #/AREA URNS AUTO: ABNORMAL /HPF
S PYO DNA THROAT QL NAA+PROBE: NOT DETECTED
SARS-COV+SARS-COV-2 AG RESP QL IA.RAPID: NEGATIVE
SODIUM SERPL-SCNC: 138 MMOL/L (ref 135–147)
SP GR UR STRIP.AUTO: 1.02 (ref 1–1.03)
T WAVE AXIS: 61 DEGREES
UROBILINOGEN UR QL STRIP.AUTO: 0.2 E.U./DL
VENTRICULAR RATE: 65 BPM
WBC # BLD AUTO: 5.07 THOUSAND/UL (ref 4.31–10.16)
WBC #/AREA URNS AUTO: ABNORMAL /HPF

## 2024-11-20 PROCEDURE — 93010 ELECTROCARDIOGRAM REPORT: CPT | Performed by: INTERNAL MEDICINE

## 2024-11-20 PROCEDURE — 80053 COMPREHEN METABOLIC PANEL: CPT | Performed by: EMERGENCY MEDICINE

## 2024-11-20 PROCEDURE — 85025 COMPLETE CBC W/AUTO DIFF WBC: CPT | Performed by: EMERGENCY MEDICINE

## 2024-11-20 PROCEDURE — 93005 ELECTROCARDIOGRAM TRACING: CPT

## 2024-11-20 PROCEDURE — 36415 COLL VENOUS BLD VENIPUNCTURE: CPT

## 2024-11-20 PROCEDURE — 99283 EMERGENCY DEPT VISIT LOW MDM: CPT

## 2024-11-20 PROCEDURE — 71046 X-RAY EXAM CHEST 2 VIEWS: CPT

## 2024-11-20 PROCEDURE — 83690 ASSAY OF LIPASE: CPT | Performed by: EMERGENCY MEDICINE

## 2024-11-20 PROCEDURE — 87651 STREP A DNA AMP PROBE: CPT

## 2024-11-20 PROCEDURE — 87811 SARS-COV-2 COVID19 W/OPTIC: CPT

## 2024-11-20 PROCEDURE — 81001 URINALYSIS AUTO W/SCOPE: CPT

## 2024-11-20 PROCEDURE — 87086 URINE CULTURE/COLONY COUNT: CPT

## 2024-11-20 PROCEDURE — 87804 INFLUENZA ASSAY W/OPTIC: CPT

## 2024-11-20 PROCEDURE — 81025 URINE PREGNANCY TEST: CPT | Performed by: EMERGENCY MEDICINE

## 2024-11-20 PROCEDURE — 96374 THER/PROPH/DIAG INJ IV PUSH: CPT

## 2024-11-20 RX ORDER — ONDANSETRON 4 MG/1
4 TABLET, ORALLY DISINTEGRATING ORAL EVERY 6 HOURS PRN
Qty: 12 TABLET | Refills: 0 | Status: SHIPPED | OUTPATIENT
Start: 2024-11-20

## 2024-11-20 RX ORDER — KETOROLAC TROMETHAMINE 30 MG/ML
15 INJECTION, SOLUTION INTRAMUSCULAR; INTRAVENOUS ONCE
Status: DISCONTINUED | OUTPATIENT
Start: 2024-11-20 | End: 2024-11-20

## 2024-11-20 RX ORDER — LIDOCAINE 50 MG/G
1 PATCH TOPICAL ONCE
Status: DISCONTINUED | OUTPATIENT
Start: 2024-11-20 | End: 2024-11-20 | Stop reason: HOSPADM

## 2024-11-20 RX ORDER — CEFPODOXIME PROXETIL 200 MG/1
200 TABLET, FILM COATED ORAL 2 TIMES DAILY
Qty: 14 TABLET | Refills: 0 | Status: SHIPPED | OUTPATIENT
Start: 2024-11-20 | End: 2024-11-27

## 2024-11-20 RX ORDER — KETOROLAC TROMETHAMINE 30 MG/ML
15 INJECTION, SOLUTION INTRAMUSCULAR; INTRAVENOUS ONCE
Status: COMPLETED | OUTPATIENT
Start: 2024-11-20 | End: 2024-11-20

## 2024-11-20 RX ORDER — NAPROXEN 500 MG/1
500 TABLET ORAL 2 TIMES DAILY WITH MEALS
Qty: 30 TABLET | Refills: 0 | Status: SHIPPED | OUTPATIENT
Start: 2024-11-20

## 2024-11-20 RX ORDER — METHOCARBAMOL 500 MG/1
500 TABLET, FILM COATED ORAL 2 TIMES DAILY
Qty: 20 TABLET | Refills: 0 | Status: SHIPPED | OUTPATIENT
Start: 2024-11-20

## 2024-11-20 RX ORDER — ONDANSETRON 4 MG/1
4 TABLET, ORALLY DISINTEGRATING ORAL ONCE
Status: COMPLETED | OUTPATIENT
Start: 2024-11-20 | End: 2024-11-20

## 2024-11-20 RX ADMIN — ONDANSETRON 4 MG: 4 TABLET, ORALLY DISINTEGRATING ORAL at 12:29

## 2024-11-20 RX ADMIN — KETOROLAC TROMETHAMINE 15 MG: 30 INJECTION, SOLUTION INTRAMUSCULAR; INTRAVENOUS at 14:24

## 2024-11-20 RX ADMIN — LIDOCAINE 1 PATCH: 50 PATCH CUTANEOUS at 14:26

## 2024-11-20 NOTE — Clinical Note
Mariposa Flood was seen and treated in our emergency department on 11/20/2024.                Diagnosis: Viral illness    Mariposa  may return to work on return date.    She may return on this date: 11/21/2024         If you have any questions or concerns, please don't hesitate to call.      Louie Michele PA-C    ______________________________           _______________          _______________  Hospital Representative                              Date                                Time

## 2024-11-20 NOTE — DISCHARGE INSTRUCTIONS
Patient advised to follow-up PCP for today's ED visit.  Patient vies return to ED with any worsening symptoms explained on discharge.  PCP information is provided above.  Did advise patient to follow-up for her anemia.    Patient was advised to return to the ED with any worsening symptoms that were explained on discharge including but not limited to chest pain, shortness of breath, irretractable vomiting or diarrhea, vision loss, loss of function, loss of sensation, syncope, hemoptysis, hematochezia, hematemesis, melena, decreased oral intake, feeling ill.    H Plasty Text: Given the location of the defect, shape of the defect and the proximity to free margins a H-plasty was deemed most appropriate for repair.  Using a sterile surgical marker, the appropriate advancement arms of the H-plasty were drawn incorporating the defect and placing the expected incisions within the relaxed skin tension lines where possible. The area thus outlined was incised deep to adipose tissue with a #15 scalpel blade. The skin margins were undermined to an appropriate distance in all directions utilizing iris scissors.  The opposing advancement arms were then advanced into place in opposite direction and anchored with interrupted buried subcutaneous sutures.

## 2024-11-20 NOTE — ED PROVIDER NOTES
Time reflects when diagnosis was documented in both MDM as applicable and the Disposition within this note       Time User Action Codes Description Comment    11/20/2024  4:03 PM Louie Michele [R68.89] Flu-like symptoms     11/20/2024  4:10 PM Louie Michele [N39.0] UTI (urinary tract infection)           ED Disposition       ED Disposition   Discharge    Condition   Stable    Date/Time   Wed Nov 20, 2024  4:03 PM    Comment   Mariposa Flood discharge to home/self care.                   Assessment & Plan       Medical Decision Making  33-year-old female presenting ED with a chief complaint of flulike symptoms.  Endorsing positive COVID test a few days ago.  Patient stated that since then she has been having shortness of breath severe chest congestion and nasal congestion.  Also endorsing a few episodes of emesis and dizziness.  Cardiopulmonary exam is benign.  Patient does have nasal congestion on exam.  Patient is sitting comfortably in exam bed.  She is in no obvious acute distress.  Abdominal exam is benign.  Abdomen soft nontender.  She does have some tenderness over her ribs but she also states that she has been coughing a lot believes the rib pain is from coughing.  Cranial nerves are all intact.  Neuroexam is benign.  Patient having normal gait in ED.  No tonsillar swelling but there is some posterior oropharyngeal erythema.  No signs of PTA.  Based on labs showing anemia in which is consistent with patient's baseline.  Anemia is microcytic likely iron deficient which I did advise the patient that she will need to follow-up with her PCP and start taking iron supplementation for this.  UA showing leukocytes and white blood cells.  Patient endorsing frequency but no dysuria.  Patient given antibiotics which were sent to the pharmacy for likely UTI.  Patient having significant relief in ED with Toradol and Zofran.  Patient is endorsing some cervical neck tenderness.  On examination with cervical neck  "there is some pain over the trapezius on the right side.  Likely trapezius muscle spasm did have relief with lidocaine patch in ED.  Range of motion is intact.  No axial tenderness.  No focal deficits throughout all extremities.  Likely myalgias/cervical muscle spasm.  Electrolyte panel is normal.  On chest x-ray no acute cardiopulmonary disease per this writer.  Viral panel and rapid strep are negative.  EKG and is normal sinus and patient is chest pain is consistent with chest congestion.  Chest pain reproducible with coughing.  With the patient having significant relief in ED and likely viral related symptoms patient was given strict return to ED protocol with any worsening symptoms that were explained on discharge.  Zofran antibiotics Naprosyn sent to the pharmacy for symptom relief.  Methocarbamol for trapezius muscle spasm.  Patient advised not to operate any machinery while taking this medication.  Disposition was explained with follow-ups    Patient understood diagnosis and treatment plan and had no further questions.  Patient was discharged in stable condition.  Patient was advised to follow-up with her PCP in 1 to 2 days.  Patient was advised to return to the ED with any worsening symptoms that were explained on discharge including but not limited to chest pain, shortness of breath, irretractable vomiting or diarrhea, vision loss, loss of function, loss of sensation, syncope, hemoptysis, hematochezia, hematemesis, melena, decreased oral intake, feeling ill.     Ddx-COVID, flu, viral illness, viral syndrome, pneumonia, pneumothorax, electrolyte abnormality, arrhythmia, gastroenteritis    Portions of the record may have been created with voice recognition software. Occasional wrong word or \"sound a like\" substitutions may have occurred due to the inherent limitations of voice recognition software. Read the chart carefully and recognize, using context, where substitutions have occurred.      Amount and/or " Complexity of Data Reviewed  Labs: ordered.     Details: See [facility]  Radiology: ordered and independent interpretation performed.     Details: See [facility]  ECG/medicine tests: ordered.    Risk  OTC drugs.  Prescription drug management.  Risk Details: Risk of worsening symptoms along with signs and symptoms worsening symptoms were thoroughly explained on discharge.  Risk of incomplete follow-up was discussed.  Patient had full understanding of all risks had no further questions and was discharged in stable condition.              Medications   ondansetron (ZOFRAN-ODT) dispersible tablet 4 mg (4 mg Oral Given 11/20/24 1229)   ketorolac (TORADOL) injection 15 mg (15 mg Intravenous Given 11/20/24 1424)       ED Risk Strat Scores                           SBIRT 22yo+      Flowsheet Row Most Recent Value   Initial Alcohol Screen: US AUDIT-C     1. How often do you have a drink containing alcohol? 0 Filed at: 11/20/2024 1611   2. How many drinks containing alcohol do you have on a typical day you are drinking?  0 Filed at: 11/20/2024 1611   3b. FEMALE Any Age, or MALE 65+: How often do you have 4 or more drinks on one occassion? 0 Filed at: 11/20/2024 1611   Audit-C Score 0 Filed at: 11/20/2024 1611   JAI: How many times in the past year have you...    Used an illegal drug or used a prescription medication for non-medical reasons? Never Filed at: 11/20/2024 1611                            History of Present Illness       Chief Complaint   Patient presents with    Vomiting     Pt reports she tested positive for COVID last Thursday, having some SOB, dizziness and CP. Reports she felt on the bathroom, denies LOC, -thinners.        Past Medical History:   Diagnosis Date    Patient denies medical problems     Patient is a currently breast-feeding mother       Past Surgical History:   Procedure Laterality Date    CHOLECYSTECTOMY      CHOLECYSTECTOMY LAPAROSCOPIC N/A 11/25/2016    Procedure: CHOLECYSTECTOMY LAPAROSCOPIC with  intraoperative cholangiogram;  Surgeon: Tj Garcia MD;  Location: [facility];  Service:     ESOPHAGOGASTRODUODENOSCOPY N/A 11/23/2016    Procedure: ESOPHAGOGASTRODUODENOSCOPY (EGD);  Surgeon: Pop Shannon MD;  Location: [facility];  Service:       Family History   Problem Relation Age of Onset    Gallbladder disease Maternal Grandmother       Social History     Tobacco Use    Smoking status: Never    Smokeless tobacco: Never   Substance Use Topics    Alcohol use: No     Comment: occ    Drug use: No      E-Cigarette/Vaping      E-Cigarette/Vaping Substances    Nicotine No     THC No     CBD No     Flavoring No     Other No     Unknown No       I have reviewed and agree with the history as documented.     33-year-old female presenting ED with a chief complaint of recent exposure to COVID flulike symptoms and some vomiting.  She states that over the past day she has had some episodes of vomiting.  Also endorses cough and congestion.  She stated that she has had some rib pain from the coughing.  Also endorsing some cervical neck pain.  Patient also stating that she is having chest congestion in the middle of her chest.  Patient stated that she recently tested positive for COVID around 4 days ago.  Denies any fevers but stated that she has had some chills.  Stated that she has been having a continued cough since then.  Also endorsing nasal congestion.  She rates her pain a 6 out of 10.  She denies take any medication before coming to the ED.  She denies any urinary or bowel changes.  She denies any recent travel or hemoptysis.Patient denies any vomiting, diarrhea, chills, diaphoresis, fevers, loss of consciousness, syncope, urinary and bowel changes, abdominal pain, visual symptoms, vision loss, loss of function, loss of sensation, decreased oral intake, hemoptysis, hematochezia, hematemesis, melena, confusion.         Review of Systems   Constitutional:  Positive for chills. Negative for activity change,  appetite change, diaphoresis, fatigue and fever.   HENT:  Positive for congestion, sinus pressure and sore throat. Negative for ear discharge, ear pain, postnasal drip, rhinorrhea and sinus pain.    Eyes:  Negative for photophobia, pain, discharge, redness, itching and visual disturbance.   Respiratory:  Positive for cough, chest tightness and shortness of breath. Negative for wheezing and stridor.    Cardiovascular:  Negative for chest pain and palpitations.   Gastrointestinal:  Positive for nausea. Negative for abdominal distention, abdominal pain, constipation, diarrhea and vomiting.   Genitourinary:  Negative for difficulty urinating, dysuria, flank pain, frequency and hematuria.   Musculoskeletal:  Negative for arthralgias, back pain, joint swelling, myalgias, neck pain and neck stiffness.   Skin:  Negative for rash and wound.   Neurological:  Positive for headaches. Negative for dizziness, tremors, syncope, facial asymmetry, weakness, light-headedness and numbness.           Objective       ED Triage Vitals   Temperature Pulse Blood Pressure Respirations SpO2 Patient Position - Orthostatic VS   11/20/24 1214 11/20/24 1214 11/20/24 1214 11/20/24 1214 11/20/24 1214 11/20/24 1429   98.8 °F (37.1 °C) 66 135/89 16 100 % Sitting      Temp Source Heart Rate Source BP Location FiO2 (%) Pain Score    11/20/24 1214 11/20/24 1214 11/20/24 1214 -- 11/20/24 1214    Oral Monitor Right arm  7      Vitals      Date and Time Temp Pulse SpO2 Resp BP Pain Score FACES Pain Rating User   11/20/24 1636 -- 62 100 % 18 114/80 -- -- HW   11/20/24 1429 -- 65 100 % 16 113/82 -- -- ES   11/20/24 1424 -- -- -- -- -- 10 - Worst Possible Pain -- ES   11/20/24 1214 98.8 °F (37.1 °C) 66 100 % 16 135/89 7 -- CO            Physical Exam  Constitutional:       General: She is not in acute distress.     Appearance: Normal appearance. She is not ill-appearing, toxic-appearing or diaphoretic.   HENT:      Head: Normocephalic.      Right Ear:  External ear normal.      Left Ear: External ear normal.      Nose: Congestion present. No rhinorrhea.      Mouth/Throat:      Mouth: Mucous membranes are moist.      Pharynx: Oropharynx is clear. Posterior oropharyngeal erythema present. No oropharyngeal exudate.   Eyes:      General:         Right eye: No discharge.         Left eye: No discharge.      Extraocular Movements: Extraocular movements intact.      Conjunctiva/sclera: Conjunctivae normal.      Pupils: Pupils are equal, round, and reactive to light.   Neck:      Comments: Neck tenderness over the right neck.  Musculoskeletal in nature.  No bony tenderness on exam.  Range of motion intact.  No axial tenderness.  Cardiovascular:      Rate and Rhythm: Normal rate and regular rhythm.   Pulmonary:      Effort: Pulmonary effort is normal. No respiratory distress.      Breath sounds: Normal breath sounds. No stridor. No wheezing, rhonchi or rales.   Chest:      Chest wall: Tenderness present.   Abdominal:      General: Bowel sounds are normal. There is no distension.      Palpations: Abdomen is soft.      Tenderness: There is no abdominal tenderness. There is no right CVA tenderness, left CVA tenderness, guarding or rebound.   Musculoskeletal:         General: Tenderness present. Normal range of motion.      Cervical back: Neck supple. Tenderness present. No rigidity.   Lymphadenopathy:      Cervical: No cervical adenopathy.   Skin:     General: Skin is warm and dry.      Capillary Refill: Capillary refill takes less than 2 seconds.      Findings: No rash.   Neurological:      Mental Status: She is alert and oriented to person, place, and time.   Psychiatric:         Mood and Affect: Mood normal.         Results Reviewed       Procedure Component Value Units Date/Time    Strep A PCR [[id number]]  (Normal) Collected: 11/20/24 1359    Lab Status: Final result Specimen: Throat Updated: 11/20/24 1450     STREP A PCR Not Detected    FLU/COVID Rapid Antigen (30 min.  TAT) - Preferred screening test in ED [[id number]]  (Normal) Collected: 11/20/24 1359    Lab Status: Final result Specimen: Nares from Nose Updated: 11/20/24 1439     SARS COV Rapid Antigen Negative     Influenza A Rapid Antigen Negative     Influenza B Rapid Antigen Negative    Narrative:      This test has been performed using the [employer]idel Katharine 2 FLU+SARS Antigen test under the Emergency Use Authorization (EUA). This test has been validated by the  and verified by the performing laboratory. The Katharine uses lateral flow immunofluorescent sandwich assay to detect SARS-COV, Influenza A and Influenza B Antigen.     The Quidel Katharine 2 SARS Antigen test does not differentiate between SARS-CoV and SARS-CoV-2.     Negative results are presumptive and may be confirmed with a molecular assay, if necessary, for patient management. Negative results do not rule out SARS-CoV-2 or influenza infection and should not be used as the sole basis for treatment or patient management decisions. A negative test result may occur if the level of antigen in a sample is below the limit of detection of this test.     Positive results are indicative of the presence of viral antigens, but do not rule out bacterial infection or co-infection with other viruses.     All test results should be used as an adjunct to clinical observations and other information available to the provider.    FOR PEDIATRIC PATIENTS - copy/paste COVID Guidelines URL to browser: [email or web address]    Urine Microscopic [[id number]]  (Abnormal) Collected: 11/20/24 1349    Lab Status: Final result Specimen: Urine, Clean Catch Updated: 11/20/24 1422     RBC, UA 1-2 /hpf      WBC, UA 10-20 /hpf      Epithelial Cells Moderate /hpf      Bacteria, UA Occasional /hpf      MUCUS THREADS Occasional    Urine culture [[id number]] Collected: 11/20/24 1349    Lab Status: In process Specimen: Urine, Clean Catch Updated:  11/20/24 1422    POCT pregnancy, urine [[id number]]  (Normal) Collected: 11/20/24 1354    Lab Status: Final result Specimen: Urine Updated: 11/20/24 1354     EXT Preg Test, Ur Negative     Control Valid    Urine Macroscopic, POC [[id number]]  (Abnormal) Collected: 11/20/24 1349    Lab Status: Final result Specimen: Urine Updated: 11/20/24 1351     Color, UA Yellow     Clarity, UA Clear     pH, UA 6.5     Leukocytes, UA Small     Nitrite, UA Negative     Protein, UA Negative mg/dl      Glucose, UA Negative mg/dl      Ketones, UA Negative mg/dl      Urobilinogen, UA 0.2 E.U./dl      Bilirubin, UA Negative     Occult Blood, UA Trace     Specific Gravity, UA 1.025    Narrative:      [facility] RESULT    Comprehensive metabolic panel [[id number]] Collected: 11/20/24 1233    Lab Status: Final result Specimen: Blood from Arm, Right Updated: 11/20/24 1300     Sodium 138 mmol/L      Potassium 4.1 mmol/L      Chloride 106 mmol/L      CO2 27 mmol/L      ANION GAP 5 mmol/L      BUN 8 mg/dL      Creatinine 0.73 mg/dL      Glucose 89 mg/dL      Calcium 9.1 mg/dL      AST 17 U/L      ALT 11 U/L      Alkaline Phosphatase 79 U/L      Total Protein 7.9 g/dL      Albumin 4.3 g/dL      Total Bilirubin 0.34 mg/dL      eGFR 110 ml/min/1.73sq m     Narrative:      National Kid[employer] Disease Foundation guidelines for Chronic Kidney Disease (CKD):     Stage 1 with normal or high GFR (GFR > 90 mL/min/1.73 square meters)    Stage 2 Mild CKD (GFR = 60-89 mL/min/1.73 square meters)    Stage 3A Moderate CKD (GFR = 45-59 mL/min/1.73 square meters)    Stage 3B Moderate CKD (GFR = 30-44 mL/min/1.73 square meters)    Stage 4 Severe CKD (GFR = 15-29 mL/min/1.73 square meters)    Stage 5 End Stage CKD (GFR <15 mL/min/1.73 square meters)  Note: GFR calculation is accurate only with a steady state creatinine    Lipase [[id number]]  (Normal) Collected: 11/20/24 1233    Lab Status: Final result Specimen: Blood from Arm, Right Updated: 11/20/24 1300     Lipase 35  u/L     CBC and differential [[id number]]  (Abnormal) Collected: 11/20/24 1233    Lab Status: Final result Specimen: Blood from Arm, Right Updated: 11/20/24 1238     WBC 5.07 Thousand/uL      RBC 4.49 Million/uL      Hemoglobin 9.4 g/dL      Hematocrit 32.7 %      MCV 73 fL      MCH 20.9 pg      MCHC 28.7 g/dL      RDW 20.3 %      Platelets 240 Thousands/uL      nRBC 0 /100 WBCs      Segmented % 48 %      Immature Grans % 0 %      Lymphocytes % 41 %      Monocytes % 8 %      Eosinophils Relative 3 %      Basophils Relative 0 %      Absolute Neutrophils 2.41 Thousands/µL      Absolute Immature Grans 0.01 Thousand/uL      Absolute Lymphocytes 2.07 Thousands/µL      Absolute Monocytes 0.39 Thousand/µL      Eosinophils Absolute 0.17 Thousand/µL      Basophils Absolute 0.02 Thousands/µL             XR chest 2 views   Final Interpretation by Francois Smith MD (11/20 1638)      No acute cardiopulmonary disease.            Workstation performed: [id number]             ECG 12 Lead Documentation Only    Date/Time: 11/20/2024 8:22 PM    Performed by: Louie Michele PA-C  Authorized by: Louie Michele PA-C    Indications / Diagnosis:  Midsternal chest pain  ECG reviewed by me, the ED Provider: yes    Patient location:  ED  Previous ECG:     Previous ECG:  Unavailable    Comparison to cardiac monitor: No    Interpretation:     Interpretation: normal    Rate:     ECG rate:  65    ECG rate assessment: normal    Rhythm:     Rhythm: sinus rhythm    Ectopy:     Ectopy: none    QRS:     QRS axis:  Normal    QRS intervals:  Normal  Conduction:     Conduction: normal    ST segments:     ST segments:  Normal  T waves:     T waves: normal        ED Medication and Procedure Management   None     Discharge Medication List as of 11/20/2024  4:33 PM        START taking these medications    Details   cefpodoxime (VANTIN) 200 mg tablet Take 1 tablet (200 mg total) by mouth 2 (two) times a day for 7 days, Starting Wed 11/20/2024,  Until Wed 11/27/2024, Normal      methocarbamol (ROBAXIN) 500 mg tablet Take 1 tablet (500 mg total) by mouth 2 (two) times a day, Starting Wed 11/20/2024, Normal      naproxen (Naprosyn) 500 mg tablet Take 1 tablet (500 mg total) by mouth 2 (two) times a day with meals, Starting Wed 11/20/2024, Normal      ondansetron (ZOFRAN-ODT) 4 mg disintegrating tablet Take 1 tablet (4 mg total) by mouth every 6 (six) hours as needed for vomiting or nausea, Starting Wed 11/20/2024, Normal           No discharge procedures on file.  ED SEPSIS DOCUMENTATION   Time reflects when diagnosis was documented in both MDM as applicable and the Disposition within this note       Time User Action Codes Description Comment    11/20/2024  4:03 PM Louie Michele [R68.89] Flu-like symptoms     11/20/2024  4:10 PM Louie Michele [N39.0] UTI (urinary tract infection)                  Louie Michele PA-C  11/20/24 3872

## 2024-11-21 LAB — BACTERIA UR CULT: NORMAL
